# Patient Record
(demographics unavailable — no encounter records)

---

## 2025-03-19 NOTE — PROCEDURE
[de-identified] :   Chronic PARTIAL ROTATOR CUFF TEAR  Procedure: PRP INJECTION      Preparation:   The patient was positioned comfortably in a seated position with the right arm supported. The skin over the venipuncture site (typically the antecubital fossa) was cleaned with an antiseptic solution.   Blood Draw:   Approximately 90 mL of the patient's blood was drawn from a vein in the antecubital fossa using a sterile technique1.   PRP Preparation:   The collected blood was placed into MAX ACP SYSTEM -a centrifuge and spun at a high speed to separate the platelet-rich plasma from the other blood components. The PRP layer was carefully extracted into a SECOND SPIN SYSTEM. SECOND SPIN ACCOMPLISHED TO REMOVE LEVCOCYTES AND FURTHER CONCENTRATE PLATELETS     Injection Site Preparation:   The skin over the lateral epicondyle of the right elbow was cleaned with an antiseptic solution. Local anesthesia (e.g., lidocaine) was administered to the skin of injection site to minimize discomfort.   PRP Injection:   Using ultrasound guidance, the PRP was injected into the area of INFRASPINATUS INJURY . The needle was carefully withdrawn, and pressure was applied to the injection site to minimize bleeding.   Post-Procedure Care:   The patient was advised to rest the elbow and avoid strenuous activities for the next 48 hours. Ice packs should be avoided 72 hours then were recommended to reduce swelling and discomfort. no NSAIDS 14 days , tylenol and Oxy ok   no gym sports 4 weeks   The patient was instructed to follow up in 2-4 weeks for assessment of response to the treatment. consider physical therapy after 4 weeks   Complications:NONE    No immediate complications were observed during or after the procedure.

## 2025-03-19 NOTE — DISCUSSION/SUMMARY
[de-identified] : NO COLD PACKS FOR 3 DAYS NO GYM EXERCISES 28 DAYS  AVOID USE OF ORAL NSAIDS FOR 14 DAYS TYLENOL, TRAMADOL, OXY OK RESUME PHYSICAL THERAPY AFTER 4 WEEKS - 2 X 4 WEEKS

## 2025-03-19 NOTE — PHYSICAL EXAM
[de-identified] : PHYSICAL EXAMBILATERAL SHOULDER   MILD SCAPULAR PROTRACTION AROM  LEFT 140 / 140 / 80 / 10  RIGHT  140 /140 / 90 / 15 TENDER: RIGHT BICEPS GROOVE   SPECIAL TESTING : LARRY - POSITIVE  TYRELL - NEGATIVE SPEED TEST - NEGATIVE   LAWSON - NEGATIVE  APPREHENSION AND SUPPRESSION - NEGATIVE   RC STRENGTH TESTING  SS:  5/5 SUB 5/5 IS     5/5 BICEPS  5/5  SENSATION  - GROSSLY INTACT  PHYSICAL EXAM LEFT  SHOULDER  NORMAL POSTURE  AROM 140 / 140 / 90 / 30 TENDER: SA REGION MERISSA-LATERAL  SPECIAL TESTING : LARRY - POSITIVE  TYRELL - NEGATIVE  SPEED TEST - POSITIVE  LAWSON - NEGATIVE  APPREHENSION AND SUPPRESSION - NEGATIVE   RC STRENGTH TESTING  SS:  5/5 SUB 5/5 IS     5/5 BICEPS  5/5  SENSATION  - GROSSLY INTACT

## 2025-03-19 NOTE — HISTORY OF PRESENT ILLNESS
Ambulatory to kemal Fritz, RN  03/08/23 [id number] [de-identified] : PATIENT IS HERE FOR TODAY FOR PRP  RIGHT SHOULDER PARTIAL TEAR  FOLLOW UP PRP TODAY - NO NSAIDS PAST 7 DAYS  8/10    PREIVOUS HPI-BILATERAL SHOULDER PAIN RIGHT SHOULDER PAIN TODAY FLARED UP 2 MONTHS AGO  PT IS GOING TO PHYSCIAL [facility]  09/22/2023- PREVIOUS CORTISONE INJ (RIGHT SH) -HELPFUL INJECTION#2- FEB 14, 2024- HELPFUL       FOLLOW UP  FLARED UP 1 MONTH AGO  PAIN LEVEL: 6/10  09/22/2023- PREVIOUS CORTISONE INJ (RIGHT SH) -HELPFUL     PREVIOUS HPI BILATERAL SHOULDER PAIN FOLLOW UP PATIENT WENT TO 30 SESSIONS OF PHYSICAL THEPRAY PAIN LEVEL: 7/10 RADIATED UP NECK AND DOWN ARM NUMBNESS IN PINKY FINGERS SHOULDER PAIN - INTERMITTANT 6/10    PREVIOUS HPI BILATERAL SHOULDER PAIN  FOLLOW UP  PATIENT WENT TO 30 SESSIONS OF PHYSICAL THEPRAY  PAIN LEVEL: 7/10  RADIATED UP NECK AND DOWN ARM  NUMBNESS IN PINKY FINGERS  SHOULDER PAIN - INTERMITTANT 6/10   PREVIOUS HPI BILATERAL SHOULDER PAIN 10 YEARS WITHOUT SPECIFIC INJURY - RHD RIGHT BICEPS BELLY VERY PAINFUL 9/10 WITH USE RADIATED UP NECK AND DOWN ARM SHOULDER PAIN - INTERMITTANT 6/10 INTERMITTENT WORSE WITH LIFTING, REACHING ACROSS, AT NIGHT, LYING DOWN BETTER WITH ICE, HEAT, TYLENOL, ADVIL, MASSAGES, RESTING INTERMITTENT WORSE WITH LIFTING, REACHING ACROSS, AT NIGHT, LYING DOWN  BETTER WITH ICE, HEAT, TYLENOL, ADVIL, MASSAGES, RESTING

## 2025-07-24 NOTE — PHYSICAL EXAM
[General Appearance - Well Developed] : well developed [Well Groomed] : well groomed [General Appearance - Well Nourished] : well nourished [No Deformities] : no deformities [General Appearance - In No Acute Distress] : no acute distress [Heart Rate And Rhythm] : heart rate was normal and rhythm regular [Heart Sounds] : normal S1 and S2 [Heart Sounds Gallop] : no gallops [Murmurs] : no murmurs [Heart Sounds Pericardial Friction Rub] : no pericardial rub [Auscultation Breath Sounds / Voice Sounds] : lungs were clear to auscultation bilaterally [Nail Clubbing] : no clubbing of the fingernails [Cyanosis, Localized] : no localized cyanosis [Petechial Hemorrhages (___cm)] : no petechial hemorrhages [] : no ischemic changes [No Acute Distress] : no acute distress [Normal Oropharynx] : normal oropharynx [Normal Appearance] : normal appearance [No Neck Mass] : no neck mass [Normal Rate/Rhythm] : normal rate/rhythm [Normal S1, S2] : normal s1, s2 [No Murmurs] : no murmurs [No Resp Distress] : no resp distress [Clear to Auscultation Bilaterally] : clear to auscultation bilaterally [Normal Gait] : normal gait [No Clubbing] : no clubbing [No Cyanosis] : no cyanosis [No Edema] : no edema [FROM] : FROM [Normal Color/ Pigmentation] : normal color/ pigmentation [No Focal Deficits] : no focal deficits [Oriented x3] : oriented x3 [Normal Affect] : normal affect

## 2025-07-24 NOTE — HISTORY OF PRESENT ILLNESS
[Never] : never [TextBox_4] : 12/29/2022 :  TERESA BALDWIN is a 55 year old male who is being evaluated for possible sleep disordered breathing.  He has been told of severe snoring and that he stops breathing during sleep.  The symptoms have been worsening over the past year.  Has mild daytime sleepiness with an [place] sleepiness score of 7 out of 24.  His weight has not recently changed.  He goes to bed at midnight, sleep latency is 45 minutes, he awakens 2-3 times for getting up at 6 AM.  Ports often waking up with nasal and sinus congestion, occasionally awakens with a headache.  He has had rare symptoms of sleep paralysis.  He is treated for hypertension and uses Combivent on occasion for intermittent asthma symptoms.  He has never smoked.  His asthma symptoms are improved since he started using Dupixent for eczema.  He has gained about 10 pounds over the last few years.  1/27/23: I reviewed his recent home sleep study with him today.  He had an apnea-hypopnea index of 30 (4%), 45 (3%).  He continues to use Combivent once or twice a day, on Dupixent for his asthma every 3 weeks.  5/25/23: Doing very well with CPAP and has noticed improvement in daytime alertness, sleep quality, and even mood.  Unfortunately he accidentally kicked his CPAP machine about a week ago and it is no longer functioning.  Download of his machine for the last 30 days shows 70% usage for more than 4 hours, apnea-hypopnea index 0.3 on treatment, median pressure 10.7, minimal leak.  He continues to have symptomatic asthma requiring using Combivent about once a day.  He is physically active but does feel himself limited by dyspnea, particularly in cold weather or in the current allergy season.  He is on Dupixent for eczema, and is not followed by a specialist for asthma.  7/17/23: OK on CPAP no download available today,.  Since taking Symbicort twice a day he feels considerably better and has not had no use of a rescue inhaler.  10/11/2023: Follow-up visit for both sleep apnea and asthma.  His asthma has been worse over the last several weeks, as his Dupixent has stopped about 6 weeks ago.  His skin has also been bothering him more.  He was on Dupixent for both asthma and eczema.  He has done a lot of traveling over the last several weeks.  He has had a cough which has been more disturbing over about the past 3 weeks.  CPAP compliance is 70% for the past 30 days on download today.  He has a ResMed a 11 machine, it is set to a pressure between 11 and 16 according to the download, on treatment his apnea-hypopnea index is 0.3.  The most recent order I can see him his chart is for a minimum pressure of 6.  Median pressure is 11.6 today.  8/1/2024: Follow-up today for both obstructive sleep apnea on CPAP and for asthma on Dupixent.  He is about to run out of Dupixent last got a shot 3 weeks ago, just renewed.  He has no complaints of cough or shortness of breath or wheezing.  No recent exacerbations.  Doing well on CPAP without problems, getting supplies well, apnea-hypopnea index well-controlled.  No complaint of daytime sleepiness.  07/22/2025 57 y.o male pt. presenting for WADE on CPAP follow-up. Pt. states that CPAP is working fine and he uses it often. The times that he has not used it is when he travels for work and is 4-5 days away. Sometimes when he goes to sleep late, he does not use CPAP so that the setup does not wake up his partner. When he does not use it, he sees the difference in that he wakes up more tired. The only other concern he has about CPAP and equipment is that the strap irritates his face, he washes it and replaced it last 4 months ago. Asthma and eczema well controlled on dupixent.   [ESS] : 1

## 2025-07-24 NOTE — HISTORY OF PRESENT ILLNESS
[Never] : never [TextBox_4] : 12/29/2022 :  TERESA BALDWIN is a 55 year old male who is being evaluated for possible sleep disordered breathing.  He has been told of severe snoring and that he stops breathing during sleep.  The symptoms have been worsening over the past year.  Has mild daytime sleepiness with an [place] sleepiness score of 7 out of 24.  His weight has not recently changed.  He goes to bed at midnight, sleep latency is 45 minutes, he awakens 2-3 times for getting up at 6 AM.  Ports often waking up with nasal and sinus congestion, occasionally awakens with a headache.  He has had rare symptoms of sleep paralysis.  He is treated for hypertension and uses Combivent on occasion for intermittent asthma symptoms.  He has never smoked.  His asthma symptoms are improved since he started using Dupixent for eczema.  He has gained about 10 pounds over the last few years.  1/27/23: I reviewed his recent home sleep study with him today.  He had an apnea-hypopnea index of 30 (4%), 45 (3%).  He continues to use Combivent once or twice a day, on Dupixent for his asthma every 3 weeks.  5/25/23: Doing very well with CPAP and has noticed improvement in daytime alertness, sleep quality, and even mood.  Unfortunately he accidentally kicked his CPAP machine about a week ago and it is no longer functioning.  Download of his machine for the last 30 days shows 70% usage for more than 4 hours, apnea-hypopnea index 0.3 on treatment, median pressure 10.7, minimal leak.  He continues to have symptomatic asthma requiring using Combivent about once a day.  He is physically active but does feel himself limited by dyspnea, particularly in cold weather or in the current allergy season.  He is on Dupixent for eczema, and is not followed by a specialist for asthma.  7/17/23: OK on CPAP no download available today,.  Since taking Symbicort twice a day he feels considerably better and has not had no use of a rescue inhaler.  10/11/2023: Follow-up visit for both sleep apnea and asthma.  His asthma has been worse over the last several weeks, as his Dupixent has stopped about 6 weeks ago.  His skin has also been bothering him more.  He was on Dupixent for both asthma and eczema.  He has done a lot of traveling over the last several weeks.  He has had a cough which has been more disturbing over about the past 3 weeks.  CPAP compliance is 70% for the past 30 days on download today.  He has a ResMed a 11 machine, it is set to a pressure between 11 and 16 according to the download, on treatment his apnea-hypopnea index is 0.3.  The most recent order I can see him his chart is for a minimum pressure of 6.  Median pressure is 11.6 today.  8/1/2024: Follow-up today for both obstructive sleep apnea on CPAP and for asthma on Dupixent.  He is about to run out of Dupixent last got a shot 3 weeks ago, just renewed.  He has no complaints of cough or shortness of breath or wheezing.  No recent exacerbations.  Doing well on CPAP without problems, getting supplies well, apnea-hypopnea index well-controlled.  No complaint of daytime sleepiness.  07/22/2025 57 y.o male pt. presenting for WADE on CPAP follow-up. Pt. states that CPAP is working fine and he uses it often. The times that he has not used it is when he travels for work and is 4-5 days away. Sometimes when he goes to sleep late, he does not use CPAP so that the setup does not wake up his partner. When he does not use it, he sees the difference in that he wakes up more tired. The only other concern he has about CPAP and equipment is that the strap irritates his face, he washes it and replaced it last 4 months ago. Asthma and eczema well controlled on dupixent.   [ESS] : 1

## 2025-07-24 NOTE — ASSESSMENT
[FreeTextEntry1] : 7/22/2025 57 y.o male with hx of WADE on CPAP  Data Reviewed HSAT 01/21/2023 pAHI 3% 45.9, pAHI 4% 30.4, severe WADE noted.  Compliance Report 04/23/2025-07/21/2025 Usage days 52/90 (58%), average usage 6hrs 4 minutes, AHI 0.6  WADE Asthma Eczema  Pt. has improved symptoms of WADE with CPAP use. Pt. will try to use CPAP more often at home if not travelling. Will order nasal mask to help pt. use it more. Sleep study with severe WADE and advised about medical risk factors associated with this. Asthma doing well will plan to refill and continue dupixent. - Compliance report reviewed and at goal with AHI normalized - Severe WADE on sleep study - Dupixent to continue and refilled.  RTC in 1 year